# Patient Record
Sex: FEMALE | Race: WHITE | NOT HISPANIC OR LATINO | Employment: FULL TIME | URBAN - METROPOLITAN AREA
[De-identification: names, ages, dates, MRNs, and addresses within clinical notes are randomized per-mention and may not be internally consistent; named-entity substitution may affect disease eponyms.]

---

## 2017-11-30 ENCOUNTER — OFFICE VISIT (OUTPATIENT)
Dept: OBSTETRICS AND GYNECOLOGY | Age: 47
End: 2017-11-30

## 2017-11-30 VITALS
SYSTOLIC BLOOD PRESSURE: 142 MMHG | WEIGHT: 137 LBS | BODY MASS INDEX: 25.21 KG/M2 | DIASTOLIC BLOOD PRESSURE: 78 MMHG | HEIGHT: 62 IN

## 2017-11-30 DIAGNOSIS — N95.1 MENOPAUSAL SYMPTOMS: ICD-10-CM

## 2017-11-30 DIAGNOSIS — Z12.4 ROUTINE CERVICAL SMEAR: Primary | ICD-10-CM

## 2017-11-30 DIAGNOSIS — Z78.0 MENOPAUSE: ICD-10-CM

## 2017-11-30 DIAGNOSIS — Z00.00 ANNUAL PHYSICAL EXAM: ICD-10-CM

## 2017-11-30 DIAGNOSIS — Z11.51 SPECIAL SCREENING EXAMINATION FOR HUMAN PAPILLOMAVIRUS (HPV): ICD-10-CM

## 2017-11-30 PROCEDURE — 99396 PREV VISIT EST AGE 40-64: CPT | Performed by: OBSTETRICS & GYNECOLOGY

## 2017-11-30 NOTE — PROGRESS NOTES
Subjective   Anna Jordan is a 47 y.o. female is being seen today for an annual exam.  Chief Complaint   Patient presents with   • Gynecologic Exam   .    History of Present Illness  Patient is here for an annual check.  She is under libido distress now because her oldest son is a senior history on a figure at the [employer] thing and whether or not to go the nasal cavity or stay local.  Her daughter her diabetic is a sophomore and doing okay.  There is no new family history.  She still has the IUD doing well with that and has been 2 years since she has been here.  No bleeding pretty much does feel cycling but was nowhere she still does first menopause because of the stress and was going on in her overreact deafness.  No other complaints  The following portions of the patient's history were reviewed and updated as appropriate: allergies, current medications, past family history, past medical history, past social history, past surgical history and problem list.    Vitals:    17 1312   BP: 142/78       PAST MEDICAL HISTORY  History reviewed. No pertinent past medical history.  OB History      Para Term  AB Living    2 2 2       SAB TAB Ectopic Multiple Live Births                Past Surgical History:   Procedure Laterality Date   • BREAST BIOPSY       Family History   Problem Relation Age of Onset   • No Known Problems Mother    • No Known Problems Father    • No Known Problems Sister    • No Known Problems Brother    • No Known Problems Daughter    • No Known Problems Son    • No Known Problems Maternal Grandmother    • No Known Problems Paternal Grandmother    • No Known Problems Maternal Aunt    • No Known Problems Paternal Aunt    • BRCA 1/2 Neg Hx    • Breast cancer Neg Hx    • Colon cancer Neg Hx    • Endometrial cancer Neg Hx    • Ovarian cancer Neg Hx      History   Smoking Status   • Never Smoker   Smokeless Tobacco   • Never Used       Current Outpatient Prescriptions:   •   levonorgestrel (MIRENA, 52 MG,) 20 MCG/24HR IUD, 1 each by Intrauterine route 1 (One) Time., Disp: , Rfl:     There is no immunization history on file for this patient.    Review of Systems   Constitutional: Negative for chills, fatigue, fever and unexpected weight change.   Respiratory: Negative for shortness of breath and wheezing.    Cardiovascular: Negative for chest pain.   Gastrointestinal: Negative for abdominal distention, abdominal pain, blood in stool, constipation, diarrhea and nausea.   Genitourinary: Negative for difficulty urinating, dyspareunia, dysuria, frequency, hematuria, menstrual problem, pelvic pain, urgency and vaginal discharge.   Skin: Negative for rash.   Psychiatric/Behavioral: The patient is nervous/anxious.        Objective   Physical Exam   Constitutional: She is oriented to person, place, and time. Vital signs are normal. She appears well-developed and well-nourished.   Neck: No thyromegaly present.   Cardiovascular: Normal rate, regular rhythm and normal heart sounds.    Pulmonary/Chest: Effort normal. Right breast exhibits no inverted nipple, no mass, no nipple discharge, no skin change and no tenderness. Left breast exhibits no inverted nipple, no mass, no nipple discharge, no skin change and no tenderness. Breasts are symmetrical. There is no breast swelling.   Abdominal: Soft.   Genitourinary: Vagina normal and uterus normal. No breast tenderness, discharge or bleeding. Pelvic exam was performed with patient supine. No labial fusion. There is no rash, tenderness, lesion or injury on the right labia. There is no rash, tenderness, lesion or injury on the left labia. Cervix exhibits no motion tenderness, no discharge and no friability. Right adnexum displays no mass, no tenderness and no fullness. Left adnexum displays no mass, no tenderness and no fullness.   Neurological: She is alert and oriented to person, place, and time.   Psychiatric: She has a normal mood and affect.   Vitals  reviewed.        Assessment/Plan   Anna was seen today for gynecologic exam.    Diagnoses and all orders for this visit:    Routine cervical smear  -     IGP, Apt HPV,rfx 16 / 18,45 - ThinPrep Vial, Cervix    Special screening examination for human papillomavirus (HPV)  -     IGP, Apt HPV,rfx 16 / 18,45 - ThinPrep Vial, Cervix    Menopause  -     Follicle Stimulating Hormone    Annual physical exam    Menopausal symptoms    Normal exam today.  Pap smear was done today.  There is a history of HPV.  Mammogram was done today.  Colonoscopy 50 bone density at menopause diet and exercise were discussed come back in 1 year

## 2017-12-01 LAB — FSH SERPL-ACNC: 9.4 MIU/ML

## 2017-12-04 ENCOUNTER — TELEPHONE (OUTPATIENT)
Dept: OBSTETRICS AND GYNECOLOGY | Age: 47
End: 2017-12-04

## 2017-12-04 NOTE — TELEPHONE ENCOUNTER
----- Message from Gunner Nguyen MD sent at 12/1/2017 12:30 PM EST -----  Tell patient her level for hormone menopause is totally in the normal range

## 2017-12-05 LAB
CYTOLOGIST CVX/VAG CYTO: NORMAL
CYTOLOGY CVX/VAG DOC THIN PREP: NORMAL
DX ICD CODE: NORMAL
HIV 1 & 2 AB SER-IMP: NORMAL
HPV I/H RISK 4 DNA CVX QL PROBE+SIG AMP: NEGATIVE
Lab: NORMAL
OTHER STN SPEC: NORMAL
PATH REPORT.FINAL DX SPEC: NORMAL
STAT OF ADQ CVX/VAG CYTO-IMP: NORMAL

## 2018-12-11 ENCOUNTER — PROCEDURE VISIT (OUTPATIENT)
Dept: OBSTETRICS AND GYNECOLOGY | Age: 48
End: 2018-12-11

## 2018-12-11 ENCOUNTER — OFFICE VISIT (OUTPATIENT)
Dept: OBSTETRICS AND GYNECOLOGY | Age: 48
End: 2018-12-11

## 2018-12-11 ENCOUNTER — APPOINTMENT (OUTPATIENT)
Dept: WOMENS IMAGING | Facility: HOSPITAL | Age: 48
End: 2018-12-11

## 2018-12-11 VITALS
DIASTOLIC BLOOD PRESSURE: 84 MMHG | HEIGHT: 62 IN | SYSTOLIC BLOOD PRESSURE: 124 MMHG | BODY MASS INDEX: 24.84 KG/M2 | WEIGHT: 135 LBS

## 2018-12-11 DIAGNOSIS — Z12.31 VISIT FOR SCREENING MAMMOGRAM: Primary | ICD-10-CM

## 2018-12-11 DIAGNOSIS — Z00.00 ANNUAL PHYSICAL EXAM: Primary | ICD-10-CM

## 2018-12-11 PROBLEM — M54.2 NECK PAIN: Status: ACTIVE | Noted: 2018-12-11

## 2018-12-11 PROCEDURE — 77067 SCR MAMMO BI INCL CAD: CPT | Performed by: OBSTETRICS & GYNECOLOGY

## 2018-12-11 PROCEDURE — 99396 PREV VISIT EST AGE 40-64: CPT | Performed by: OBSTETRICS & GYNECOLOGY

## 2018-12-11 PROCEDURE — 77067 SCR MAMMO BI INCL CAD: CPT | Performed by: RADIOLOGY

## 2018-12-11 NOTE — PROGRESS NOTES
Subjective   Anna Jordan is a 48 y.o. female is being seen today for   Chief Complaint   Patient presents with   • Gynecologic Exam     AE today. MG today @ 10:00.   2017 neg pap, neg HPV (HX of ANISHA-1, LEEP).  MG today.    .    History of Present Illness  Patient is here for an annual check.  She is doing well no complaints this year no medical problems.  Nothing new in the family and a bowels bladder work well.  Her son is at  in the [employer] the [place] thing did not work out and he is potentially dating someone might be little different.  Daughter is now jennifer high school diabetes under control started her cycles tried 1 birth control did not agree with that.  No other complaints  The following portions of the patient's history were reviewed and updated as appropriate: allergies, current medications, past family history, past medical history, past social history, past surgical history and problem list.    Vitals:    18 0929   BP: 124/84       PAST MEDICAL HISTORY  Past Medical History:   Diagnosis Date   • Dysplasia of cervix, low grade (ANISHA 1)      OB History      Para Term  AB Living    2 2 2     2    SAB TAB Ectopic Molar Multiple Live Births              2        Past Surgical History:   Procedure Laterality Date   • BREAST BIOPSY     • CERVICAL BIOPSY  W/ LOOP ELECTRODE EXCISION       Family History   Problem Relation Age of Onset   • No Known Problems Mother    • No Known Problems Father    • No Known Problems Sister    • No Known Problems Brother    • No Known Problems Daughter    • No Known Problems Son    • No Known Problems Maternal Grandmother    • No Known Problems Paternal Grandmother    • No Known Problems Maternal Aunt    • No Known Problems Paternal Aunt    • BRCA 1/2 Neg Hx    • Breast cancer Neg Hx    • Colon cancer Neg Hx    • Endometrial cancer Neg Hx    • Ovarian cancer Neg Hx      Social History     Tobacco Use   Smoking Status Never Smoker   Smokeless Tobacco Never  Used       Current Outpatient Medications:   •  levonorgestrel (MIRENA, 52 MG,) 20 MCG/24HR IUD, 1 each by Intrauterine route 1 (One) Time., Disp: , Rfl:     There is no immunization history on file for this patient.    Review of Systems   Constitutional: Negative for chills, fatigue, fever and unexpected weight change.   Respiratory: Negative for shortness of breath and wheezing.    Cardiovascular: Negative for chest pain.   Gastrointestinal: Negative for abdominal distention, abdominal pain, blood in stool, constipation, diarrhea and nausea.   Genitourinary: Negative for difficulty urinating, dyspareunia, dysuria, frequency, hematuria, menstrual problem, pelvic pain, urgency and vaginal discharge.   Skin: Negative for rash.       Objective   Physical Exam   Constitutional: She is oriented to person, place, and time. Vital signs are normal. She appears well-developed and well-nourished.   Neck: No thyromegaly present.   Cardiovascular: Normal rate, regular rhythm and normal heart sounds.   Pulmonary/Chest: Effort normal. Right breast exhibits no inverted nipple, no mass, no nipple discharge, no skin change and no tenderness. Left breast exhibits no inverted nipple, no mass, no nipple discharge, no skin change and no tenderness. Breasts are symmetrical. There is no breast swelling.   Abdominal: Soft.   Genitourinary: Vagina normal and uterus normal. No breast tenderness, discharge or bleeding. Pelvic exam was performed with patient supine. No labial fusion. There is no rash, tenderness, lesion or injury on the right labia. There is no rash, tenderness, lesion or injury on the left labia. Cervix exhibits no motion tenderness, no discharge and no friability. Right adnexum displays no mass, no tenderness and no fullness. Left adnexum displays no mass, no tenderness and no fullness.   Neurological: She is alert and oriented to person, place, and time.   Psychiatric: She has a normal mood and affect.   Vitals  reviewed.        Assessment/Plan   Anna was seen today for gynecologic exam.    Diagnoses and all orders for this visit:    Annual physical exam      Normal exam today.  Pap smear is done last year so not done today.  Mammogram was done today.  Colonoscopy next year.  Still doing well with the IUD has 2 more years to go.  She is good with diet and exercise come back in a year

## 2020-04-29 ENCOUNTER — PROCEDURE VISIT (OUTPATIENT)
Dept: OBSTETRICS AND GYNECOLOGY | Age: 50
End: 2020-04-29

## 2020-04-29 ENCOUNTER — APPOINTMENT (OUTPATIENT)
Dept: WOMENS IMAGING | Facility: HOSPITAL | Age: 50
End: 2020-04-29

## 2020-04-29 DIAGNOSIS — Z12.31 VISIT FOR SCREENING MAMMOGRAM: Primary | ICD-10-CM

## 2020-04-29 PROCEDURE — 77067 SCR MAMMO BI INCL CAD: CPT | Performed by: OBSTETRICS & GYNECOLOGY

## 2020-04-29 PROCEDURE — 77067 SCR MAMMO BI INCL CAD: CPT | Performed by: RADIOLOGY

## 2020-04-30 DIAGNOSIS — R92.8 ABNORMAL MAMMOGRAM: Primary | ICD-10-CM

## 2020-04-30 PROCEDURE — 76641 ULTRASOUND BREAST COMPLETE: CPT | Performed by: OBSTETRICS & GYNECOLOGY

## 2020-05-08 ENCOUNTER — APPOINTMENT (OUTPATIENT)
Dept: WOMENS IMAGING | Facility: HOSPITAL | Age: 50
End: 2020-05-08

## 2020-05-08 ENCOUNTER — TELEPHONE (OUTPATIENT)
Dept: OBSTETRICS AND GYNECOLOGY | Age: 50
End: 2020-05-08

## 2020-05-08 PROCEDURE — 77065 DX MAMMO INCL CAD UNI: CPT | Performed by: RADIOLOGY

## 2020-05-08 PROCEDURE — 76641 ULTRASOUND BREAST COMPLETE: CPT | Performed by: RADIOLOGY

## 2020-05-08 PROCEDURE — 77061 BREAST TOMOSYNTHESIS UNI: CPT | Performed by: RADIOLOGY

## 2020-05-08 PROCEDURE — G0279 TOMOSYNTHESIS, MAMMO: HCPCS | Performed by: RADIOLOGY

## 2020-05-08 NOTE — TELEPHONE ENCOUNTER
(Wendy pt) Pt is wanting to speak with Dr. Nguyen in regards to additional imaging she is needing. Pt is aware you are not in office until Tuesday and is okay to wait.     [phone]

## 2020-05-11 ENCOUNTER — TELEPHONE (OUTPATIENT)
Dept: OBSTETRICS AND GYNECOLOGY | Age: 50
End: 2020-05-11

## 2020-05-11 NOTE — TELEPHONE ENCOUNTER
(Wendy pt) Lanie from [facility] is calling for a left breast cyst aspiration order. Pt is not aware and they are faxing over the report.     [phone]

## 2020-05-12 ENCOUNTER — PROCEDURE VISIT (OUTPATIENT)
Dept: OBSTETRICS AND GYNECOLOGY | Age: 50
End: 2020-05-12

## 2020-05-12 ENCOUNTER — OFFICE VISIT (OUTPATIENT)
Dept: OBSTETRICS AND GYNECOLOGY | Age: 50
End: 2020-05-12

## 2020-05-12 VITALS
HEIGHT: 62 IN | SYSTOLIC BLOOD PRESSURE: 120 MMHG | DIASTOLIC BLOOD PRESSURE: 74 MMHG | BODY MASS INDEX: 26.5 KG/M2 | WEIGHT: 144 LBS

## 2020-05-12 DIAGNOSIS — N60.02 BREAST CYST, LEFT: ICD-10-CM

## 2020-05-12 DIAGNOSIS — R14.0 BLOATING: Primary | ICD-10-CM

## 2020-05-12 DIAGNOSIS — R14.0 ABDOMINAL BLOATING: Primary | ICD-10-CM

## 2020-05-12 DIAGNOSIS — R92.8 ABNORMAL MAMMOGRAM: ICD-10-CM

## 2020-05-12 DIAGNOSIS — N83.201 CYSTS OF BOTH OVARIES: ICD-10-CM

## 2020-05-12 DIAGNOSIS — R92.8 ABNORMAL MAMMOGRAM: Primary | ICD-10-CM

## 2020-05-12 DIAGNOSIS — N83.202 CYSTS OF BOTH OVARIES: ICD-10-CM

## 2020-05-12 PROCEDURE — 76830 TRANSVAGINAL US NON-OB: CPT | Performed by: OBSTETRICS & GYNECOLOGY

## 2020-05-12 PROCEDURE — 99213 OFFICE O/P EST LOW 20 MIN: CPT | Performed by: OBSTETRICS & GYNECOLOGY

## 2020-05-12 NOTE — PROGRESS NOTES
Subjective   Anna Jordan is a 49 y.o. female is being seen today for   Chief Complaint   Patient presents with   • Follow-up     US today. Pt c/o bloating.    .    History of Present Illness  Patient is here for a problem check.  He been complaining of abdominal bloating some back pain fairly recently.  We have discussed this on the phone and I recommend an ultrasound to to rule out any ovarian pathology.  So she presents with that.  She denies eating still having cycles.  She also had abnormal mammogram but call back and had a recommended a potential aspiration versus biopsy and I will order that we talked about that today.  The following portions of the patient's history were reviewed and updated as appropriate: allergies, current medications, past family history, past medical history, past social history, past surgical history and problem list.    Vitals:    20 1315   BP: 120/74         PAST MEDICAL HISTORY  Past Medical History:   Diagnosis Date   • Dysplasia of cervix, low grade (ANISHA 1)      OB History        2    Para   2    Term   2            AB        Living   2       SAB        TAB        Ectopic        Molar        Multiple        Live Births   2              Past Surgical History:   Procedure Laterality Date   • BREAST BIOPSY     • CERVICAL BIOPSY  W/ LOOP ELECTRODE EXCISION       Family History   Problem Relation Age of Onset   • No Known Problems Mother    • No Known Problems Father    • No Known Problems Sister    • No Known Problems Brother    • No Known Problems Daughter    • No Known Problems Son    • No Known Problems Maternal Grandmother    • No Known Problems Paternal Grandmother    • No Known Problems Maternal Aunt    • No Known Problems Paternal Aunt    • BRCA 1/2 Neg Hx    • Breast cancer Neg Hx    • Colon cancer Neg Hx    • Endometrial cancer Neg Hx    • Ovarian cancer Neg Hx      Social History     Tobacco Use   Smoking Status Never Smoker   Smokeless Tobacco Never  Used       Current Outpatient Medications:   •  levonorgestrel (MIRENA, 52 MG,) 20 MCG/24HR IUD, 1 each by Intrauterine route 1 (One) Time., Disp: , Rfl:     There is no immunization history on file for this patient.    Review of Systems  Abdominal bloating  Objective   Physical Exam  Well-developed well-nourished female no acute distress    Assessment/Plan   Anna was seen today for follow-up.    Diagnoses and all orders for this visit:    Abdominal bloating    Abnormal mammogram      Transvaginal ultrasound was performed and I was in the room the whole time discussed everything with the patient.  Basically the ultrasound was normal uterus looked fine lining was appropriate IUD present she had a couple cysts on the left ovary is maximum is 18 mm look very clean and the right ovary is 26 mm again look clean also.  To have discussion like to have her back repeat in a couple of months.  The plan is follow-up couple months with the ultrasound and repeat the breast aspiration/biopsy

## 2020-05-20 ENCOUNTER — APPOINTMENT (OUTPATIENT)
Dept: WOMENS IMAGING | Facility: HOSPITAL | Age: 50
End: 2020-05-20

## 2020-05-20 PROCEDURE — 76942 ECHO GUIDE FOR BIOPSY: CPT | Performed by: RADIOLOGY

## 2020-05-20 PROCEDURE — 19000 PUNCTURE ASPIR CYST BREAST: CPT | Performed by: RADIOLOGY

## 2020-10-19 ENCOUNTER — OFFICE VISIT (OUTPATIENT)
Dept: OBSTETRICS AND GYNECOLOGY | Age: 50
End: 2020-10-19

## 2020-10-19 VITALS
WEIGHT: 142.4 LBS | SYSTOLIC BLOOD PRESSURE: 100 MMHG | HEIGHT: 62 IN | DIASTOLIC BLOOD PRESSURE: 62 MMHG | BODY MASS INDEX: 26.2 KG/M2

## 2020-10-19 DIAGNOSIS — Z11.51 ENCOUNTER FOR SCREENING FOR HUMAN PAPILLOMAVIRUS (HPV): ICD-10-CM

## 2020-10-19 DIAGNOSIS — Z01.419 ENCOUNTER FOR GYNECOLOGICAL EXAMINATION: Primary | ICD-10-CM

## 2020-10-19 DIAGNOSIS — R92.8 ABNORMAL MAMMOGRAM: ICD-10-CM

## 2020-10-19 DIAGNOSIS — Z12.11 SCREENING FOR COLON CANCER: ICD-10-CM

## 2020-10-19 PROCEDURE — 99396 PREV VISIT EST AGE 40-64: CPT | Performed by: OBSTETRICS & GYNECOLOGY

## 2020-10-19 NOTE — PROGRESS NOTES
.  Subjective     Chief Complaint   Patient presents with   • Gynecologic Exam     NP  AE  LAST PAP 17-, HPV- (MERVAT), MG 20       History of Present Illness    Anna Jordan is a 50 y.o.  who presents for annual exam. She is a pt of Dr. Nguyen and new to this MD.  She has spotting about every-other-month with Mirena  She denies any gyn issues today  She lives outside [place]. 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  Her daughter is starting at [employer] and will be running for [employer]. Her son is at [employer] as well.    She had f/u breast imaging and biopsy in spring. She denies any masses or breast concerns today    Obstetric History:  OB History        2    Para   2    Term   2            AB        Living   2       SAB        TAB        Ectopic        Molar        Multiple        Live Births   2               Menstrual History:     No LMP recorded (lmp unknown). (Menstrual status: Other).         Current contraception: IUD--pt believes in was placed , reports she has mirena  History of abnormal Pap smear: yes, treated with LEEP  Received Gardasil immunization: no  Perform regular self breast exam: no  Family history of uterine or ovarian cancer: no  Family History of colon cancer: pt is unsure  Family history of breast cancer: yes - mat cousin    Mammogram: had bilateral mammogram 2020, had cyst aspiration that was benign. Due for f/u mammogram on left  Colonoscopy: recommended.  DEXA: not indicated.    Exercise: moderately active  Calcium/Vitamin D: adequate intake    The following portions of the patient's history were reviewed and updated as appropriate: allergies, current medications, past family history, past medical history, past social history, past surgical history and problem list.    Review of Systems    Review of Systems   Constitutional: Negative for fatigue.   Respiratory: Negative for shortness of breath.    Gastrointestinal: Negative for abdominal  "pain. Negative for change in bowel habits  Genitourinary: Negative for dysuria. Negative for abnormal bleeding or pelvic pain  Neurological: Negative for headaches.   Psychiatric/Behavioral: Negative for dysphoric mood.         Objective   Physical Exam    /62   Ht 157.5 cm (62\")   Wt 64.6 kg (142 lb 6.4 oz)   LMP  (LMP Unknown) Comment: Mirena  Breastfeeding No   BMI 26.05 kg/m²   General:   alert and no distress   Heart: regular rate and rhythm   Lungs: clear to auscultation bilaterally   Breast: Inspection negative; no masses, retractions, nipple discharge or axillary adenopathy   Neck: Supple, no thyromegaly   Abdomen: soft, non-tender. No masses,  no organomegaly   Pelvis: External genitalia: normal general appearance  Urinary system: urethral meatus normal  Vaginal: normal mucosa without prolapse or lesions  Cervix: normal appearance and IUD string visualized  Adnexa: no masses or tenderness  Uterus: normal, nontender   Extremities: Extremities normal, atraumatic, no cyanosis or edema   Neurologic: Alert and oriented   Psychiatric: Normal affect, judgement, and mood     Assessment/Plan   Diagnoses and all orders for this visit:    1. Encounter for gynecological examination (Primary)  -     IGP, Apt HPV,rfx 16 / 18,45    2. Encounter for screening for human papillomavirus (HPV)  -     IGP, Apt HPV,rfx 16 / 18,45    3. Screening for colon cancer  -     Ambulatory Referral to General Surgery    4. Abnormal mammogram  -     Mammo diagnostic digital tomosynthesis left w CAD; Future        All questions answered.  Breast self exam technique reviewed and patient encouraged to perform self-exam monthly.  Discussed healthy lifestyle modifications.  Recommended 30 minutes of aerobic exercise five times per week.  Discussed calcium needs to prevent osteoporosis.    Advised pt to call if she does not receive results of pap within 2 weeks  Encouraged her to book f/u breast imaging as she is due in November. Pt " reports she will schedule. Orders placed.   Advised pt to call if she does not receive contact to schedule colonoscopy within 2 weeks    Pt denies menopause symptoms and desires to keep mirena for now

## 2020-10-21 LAB
CYTOLOGIST CVX/VAG CYTO: NORMAL
CYTOLOGY CVX/VAG DOC CYTO: NORMAL
CYTOLOGY CVX/VAG DOC THIN PREP: NORMAL
DX ICD CODE: NORMAL
HIV 1 & 2 AB SER-IMP: NORMAL
HPV I/H RISK 4 DNA CVX QL PROBE+SIG AMP: NEGATIVE
OTHER STN SPEC: NORMAL
STAT OF ADQ CVX/VAG CYTO-IMP: NORMAL

## 2020-10-22 ENCOUNTER — TELEPHONE (OUTPATIENT)
Dept: OBSTETRICS AND GYNECOLOGY | Age: 50
End: 2020-10-22

## 2020-10-22 NOTE — TELEPHONE ENCOUNTER
----- Message from Nova Cowan MD sent at 10/21/2020 10:37 PM EDT -----  Please call Anna, her pap and hpv are negative/normal

## 2021-10-12 ENCOUNTER — TELEPHONE (OUTPATIENT)
Dept: INTERNAL MEDICINE | Facility: CLINIC | Age: 51
End: 2021-10-12

## 2021-10-12 DIAGNOSIS — Z00.00 ROUTINE PHYSICAL EXAMINATION: Primary | ICD-10-CM

## 2021-10-12 NOTE — TELEPHONE ENCOUNTER
Make her a visit in the next week or so probably next week with labs    CBC, comprehensive profile, lipid profile, TSH, 25-hydroxy vitamin D level    Make those out and I will sign those she can do those prior to her visit with me put her on at the end of the day or first person of the day

## 2021-10-12 NOTE — TELEPHONE ENCOUNTER
Pt hasn't been seen for a while and she is wanting labs and she states that her kids are stressing her out and she stated that you would know what that means.     She would be a new patient to you.

## 2021-10-15 ENCOUNTER — LAB (OUTPATIENT)
Dept: LAB | Facility: HOSPITAL | Age: 51
End: 2021-10-15

## 2021-10-15 DIAGNOSIS — Z00.00 ROUTINE PHYSICAL EXAMINATION: ICD-10-CM

## 2021-10-15 LAB
25(OH)D3 SERPL-MCNC: 47.5 NG/ML
ALBUMIN SERPL-MCNC: 4.5 G/DL (ref 3.5–5.2)
ALBUMIN/GLOB SERPL: 1.7 G/DL
ALP SERPL-CCNC: 56 U/L (ref 39–117)
ALT SERPL W P-5'-P-CCNC: 14 U/L (ref 1–33)
ANION GAP SERPL CALCULATED.3IONS-SCNC: 10.7 MMOL/L (ref 5–15)
AST SERPL-CCNC: 20 U/L (ref 1–32)
BASOPHILS # BLD AUTO: 0.07 10*3/MM3 (ref 0–0.2)
BASOPHILS NFR BLD AUTO: 1.4 % (ref 0–1.5)
BILIRUB SERPL-MCNC: 0.4 MG/DL (ref 0–1.2)
BUN SERPL-MCNC: 10 MG/DL (ref 6–20)
BUN/CREAT SERPL: 10.9 (ref 7–25)
CALCIUM SPEC-SCNC: 9.4 MG/DL (ref 8.6–10.5)
CHLORIDE SERPL-SCNC: 104 MMOL/L (ref 98–107)
CHOLEST SERPL-MCNC: 188 MG/DL (ref 0–200)
CO2 SERPL-SCNC: 27.3 MMOL/L (ref 22–29)
CREAT SERPL-MCNC: 0.92 MG/DL (ref 0.57–1)
DEPRECATED RDW RBC AUTO: 39.3 FL (ref 37–54)
EOSINOPHIL # BLD AUTO: 0.05 10*3/MM3 (ref 0–0.4)
EOSINOPHIL NFR BLD AUTO: 1 % (ref 0.3–6.2)
ERYTHROCYTE [DISTWIDTH] IN BLOOD BY AUTOMATED COUNT: 12 % (ref 12.3–15.4)
GFR SERPL CREATININE-BSD FRML MDRD: 64 ML/MIN/1.73
GLOBULIN UR ELPH-MCNC: 2.6 GM/DL
GLUCOSE SERPL-MCNC: 101 MG/DL (ref 65–99)
HCT VFR BLD AUTO: 42.5 % (ref 34–46.6)
HDLC SERPL-MCNC: 68 MG/DL (ref 40–60)
HGB BLD-MCNC: 14.4 G/DL (ref 12–15.9)
IMM GRANULOCYTES # BLD AUTO: 0.03 10*3/MM3 (ref 0–0.05)
IMM GRANULOCYTES NFR BLD AUTO: 0.6 % (ref 0–0.5)
LDLC SERPL CALC-MCNC: 111 MG/DL (ref 0–100)
LDLC/HDLC SERPL: 1.62 {RATIO}
LYMPHOCYTES # BLD AUTO: 1.39 10*3/MM3 (ref 0.7–3.1)
LYMPHOCYTES NFR BLD AUTO: 28.5 % (ref 19.6–45.3)
MCH RBC QN AUTO: 30.5 PG (ref 26.6–33)
MCHC RBC AUTO-ENTMCNC: 33.9 G/DL (ref 31.5–35.7)
MCV RBC AUTO: 90 FL (ref 79–97)
MONOCYTES # BLD AUTO: 0.32 10*3/MM3 (ref 0.1–0.9)
MONOCYTES NFR BLD AUTO: 6.6 % (ref 5–12)
NEUTROPHILS NFR BLD AUTO: 3.02 10*3/MM3 (ref 1.7–7)
NEUTROPHILS NFR BLD AUTO: 61.9 % (ref 42.7–76)
NRBC BLD AUTO-RTO: 0 /100 WBC (ref 0–0.2)
PLATELET # BLD AUTO: 246 10*3/MM3 (ref 140–450)
PMV BLD AUTO: 10.7 FL (ref 6–12)
POTASSIUM SERPL-SCNC: 4.4 MMOL/L (ref 3.5–5.2)
PROT SERPL-MCNC: 7.1 G/DL (ref 6–8.5)
RBC # BLD AUTO: 4.72 10*6/MM3 (ref 3.77–5.28)
SODIUM SERPL-SCNC: 142 MMOL/L (ref 136–145)
TRIGL SERPL-MCNC: 49 MG/DL (ref 0–150)
TSH SERPL DL<=0.05 MIU/L-ACNC: 0.74 UIU/ML (ref 0.27–4.2)
VLDLC SERPL-MCNC: 9 MG/DL (ref 5–40)
WBC # BLD AUTO: 4.88 10*3/MM3 (ref 3.4–10.8)

## 2021-10-15 PROCEDURE — 36415 COLL VENOUS BLD VENIPUNCTURE: CPT

## 2021-10-15 PROCEDURE — 80061 LIPID PANEL: CPT

## 2021-10-15 PROCEDURE — 80053 COMPREHEN METABOLIC PANEL: CPT

## 2021-10-15 PROCEDURE — 84443 ASSAY THYROID STIM HORMONE: CPT

## 2021-10-15 PROCEDURE — 85025 COMPLETE CBC W/AUTO DIFF WBC: CPT

## 2021-10-15 PROCEDURE — 82306 VITAMIN D 25 HYDROXY: CPT

## 2021-10-26 ENCOUNTER — PROCEDURE VISIT (OUTPATIENT)
Dept: OBSTETRICS AND GYNECOLOGY | Age: 51
End: 2021-10-26

## 2021-10-26 ENCOUNTER — APPOINTMENT (OUTPATIENT)
Dept: WOMENS IMAGING | Facility: HOSPITAL | Age: 51
End: 2021-10-26

## 2021-10-26 ENCOUNTER — OFFICE VISIT (OUTPATIENT)
Dept: OBSTETRICS AND GYNECOLOGY | Age: 51
End: 2021-10-26

## 2021-10-26 VITALS
BODY MASS INDEX: 23.19 KG/M2 | SYSTOLIC BLOOD PRESSURE: 110 MMHG | WEIGHT: 126 LBS | HEIGHT: 62 IN | DIASTOLIC BLOOD PRESSURE: 68 MMHG

## 2021-10-26 DIAGNOSIS — Z12.31 VISIT FOR SCREENING MAMMOGRAM: Primary | ICD-10-CM

## 2021-10-26 DIAGNOSIS — Z12.11 SCREENING FOR COLON CANCER: ICD-10-CM

## 2021-10-26 DIAGNOSIS — R10.2 ADNEXAL TENDERNESS: ICD-10-CM

## 2021-10-26 DIAGNOSIS — Z11.51 ENCOUNTER FOR SCREENING FOR HUMAN PAPILLOMAVIRUS (HPV): Primary | ICD-10-CM

## 2021-10-26 DIAGNOSIS — Z01.419 ENCOUNTER FOR GYNECOLOGICAL EXAMINATION: ICD-10-CM

## 2021-10-26 PROCEDURE — 77063 BREAST TOMOSYNTHESIS BI: CPT | Performed by: STUDENT IN AN ORGANIZED HEALTH CARE EDUCATION/TRAINING PROGRAM

## 2021-10-26 PROCEDURE — 77063 BREAST TOMOSYNTHESIS BI: CPT | Performed by: RADIOLOGY

## 2021-10-26 PROCEDURE — 77067 SCR MAMMO BI INCL CAD: CPT | Performed by: STUDENT IN AN ORGANIZED HEALTH CARE EDUCATION/TRAINING PROGRAM

## 2021-10-26 PROCEDURE — 77067 SCR MAMMO BI INCL CAD: CPT | Performed by: RADIOLOGY

## 2021-10-26 PROCEDURE — 99396 PREV VISIT EST AGE 40-64: CPT | Performed by: OBSTETRICS & GYNECOLOGY

## 2021-10-26 RX ORDER — MULTIPLE VITAMINS W/ MINERALS TAB 9MG-400MCG
1 TAB ORAL DAILY
COMMUNITY

## 2021-10-26 NOTE — PROGRESS NOTES
".  Subjective     Chief Complaint   Patient presents with   • Gynecologic Exam     AC, last pap 10-.       History of Present Illness    Anna Jordan is a 51 y.o.  who presents for annual exam.  She reports she has very light monthly menses/spotting with Mirena  She notes her mother  this last year. She had recently had a stent but they are not sure of the cause. She notes it has been rough but she is dealing.       Obstetric History:  OB History        2    Para   2    Term   2            AB        Living   2       SAB        IAB        Ectopic        Molar        Multiple        Live Births   2               Menstrual History:     No LMP recorded. Patient has had an implant.         Current contraception: IUD--pt unsure when placed ( reviewed records, Dr. Nguyen had noted in 2018 that she had 2 more years )  History of abnormal Pap smear: yes - had LEEP in past  Received Gardasil immunization: no  Perform regular self breast exam: no  Family history of uterine or ovarian cancer: no  Family History of colon cancer: no  Family history of breast cancer: yes - cousin    Mammogram: ordered.  Colonoscopy: recommended.  DEXA: not indicated.    Exercise: moderately active  Calcium/Vitamin D: adequate intake    The following portions of the patient's history were reviewed and updated as appropriate: allergies, current medications, past family history, past medical history, past social history, past surgical history and problem list.    Review of Systems    Review of Systems   Constitutional: Negative for fatigue.   Respiratory: Negative for shortness of breath.    Gastrointestinal: Positive for occ abdominal pain and change in bowel habits recently.   Genitourinary: Negative for heavy bleeding with Mirena  Neurological: Negative for headaches.   Psychiatric/Behavioral: Negative for dysphoric mood.         Objective   Physical Exam    /68   Ht 157.5 cm (62\")   Wt 57.2 kg (126 lb) "   Breastfeeding No   BMI 23.05 kg/m²   General:   alert and no distress   Heart: regular rate and rhythm   Lungs: clear to auscultation bilaterally   Breast: Inspection negative; no masses, retractions, nipple discharge or axillary adenopathy   Neck: supple   Abdomen: soft, non-tender. No masses,  no organomegaly   Pelvis: External genitalia: normal general appearance  Urinary system: urethral meatus normal  Vaginal: normal mucosa without prolapse or lesions  Cervix: normal appearance and IUD string visualized  Adnexa: no masses, tender to left  Uterus: normal, nontender   Extremities: Extremities normal, atraumatic, no cyanosis or edema   Neurologic: Alert and oriented   Psychiatric: Normal affect, judgement, and mood     Assessment/Plan   Diagnoses and all orders for this visit:    1. Encounter for screening for human papillomavirus (HPV) (Primary)  -     IGP, Apt HPV,rfx 16 / 18,45    2. Encounter for gynecological examination  -     IGP, Apt HPV,rfx 16 / 18,45    3. Screening for colon cancer  -     Ambulatory Referral For Screening Colonoscopy    4. Adnexal tenderness        All questions answered.  Breast self exam technique reviewed and patient encouraged to perform self-exam monthly.  Discussed healthy lifestyle modifications.  Recommended 30 minutes of aerobic exercise five times per week.  Discussed calcium needs to prevent osteoporosis.  Advised pt to call if she does not receive results of pap and mammogram within 2 weeks    Recommend u/s for adnexal tenderness and ordered. Pt agrees.     Pt did not proceed with colonoscopy after ordered last year. She reports she will consider this year. Order placed. Recommend she call office if she does not receive contact to book within 2 weeks.     Recommend removal of IUD. Discussed that records indicated Dr. Nguyen planned removal in 2020. Discussed that Mirena approval for use extended to 7 years. Discussed that she may be menopausal and could remove IUD and  observe or could check FSH. She will consider and declines removal of IUD at this time.

## 2021-11-01 ENCOUNTER — TELEPHONE (OUTPATIENT)
Dept: OBSTETRICS AND GYNECOLOGY | Age: 51
End: 2021-11-01

## 2021-11-01 PROBLEM — N83.202 CYSTS OF BOTH OVARIES: Status: RESOLVED | Noted: 2020-05-12 | Resolved: 2021-11-01

## 2021-11-01 PROBLEM — N83.201 CYSTS OF BOTH OVARIES: Status: RESOLVED | Noted: 2020-05-12 | Resolved: 2021-11-01

## 2021-11-01 PROBLEM — R14.0 ABDOMINAL BLOATING: Status: RESOLVED | Noted: 2020-05-12 | Resolved: 2021-11-01

## 2021-11-01 LAB
CYTOLOGIST CVX/VAG CYTO: NORMAL
CYTOLOGY CVX/VAG DOC CYTO: NORMAL
CYTOLOGY CVX/VAG DOC THIN PREP: NORMAL
DX ICD CODE: NORMAL
HIV 1 & 2 AB SER-IMP: NORMAL
HPV I/H RISK 4 DNA CVX QL PROBE+SIG AMP: NEGATIVE
OTHER STN SPEC: NORMAL
PATHOLOGIST CVX/VAG CYTO: NORMAL
STAT OF ADQ CVX/VAG CYTO-IMP: NORMAL

## 2021-11-01 NOTE — TELEPHONE ENCOUNTER
Called pt and reviewed u/s results. Normal ovaries noted and IUD in place. She wants to leave IUD for now. Discussed normal pap results as well. Advised her to call if she does not receive results of mammogram within the next week. It is pending currently.

## 2022-10-31 ENCOUNTER — PROCEDURE VISIT (OUTPATIENT)
Dept: OBSTETRICS AND GYNECOLOGY | Age: 52
End: 2022-10-31

## 2022-10-31 ENCOUNTER — APPOINTMENT (OUTPATIENT)
Dept: WOMENS IMAGING | Facility: HOSPITAL | Age: 52
End: 2022-10-31

## 2022-10-31 ENCOUNTER — OFFICE VISIT (OUTPATIENT)
Dept: OBSTETRICS AND GYNECOLOGY | Age: 52
End: 2022-10-31

## 2022-10-31 VITALS
WEIGHT: 131 LBS | DIASTOLIC BLOOD PRESSURE: 62 MMHG | SYSTOLIC BLOOD PRESSURE: 110 MMHG | BODY MASS INDEX: 24.11 KG/M2 | HEIGHT: 62 IN

## 2022-10-31 DIAGNOSIS — Z12.11 SCREENING FOR COLON CANCER: ICD-10-CM

## 2022-10-31 DIAGNOSIS — Z01.419 ENCOUNTER FOR GYNECOLOGICAL EXAMINATION: Primary | ICD-10-CM

## 2022-10-31 DIAGNOSIS — Z11.51 ENCOUNTER FOR SCREENING FOR HUMAN PAPILLOMAVIRUS (HPV): ICD-10-CM

## 2022-10-31 DIAGNOSIS — N91.2 AMENORRHEA: ICD-10-CM

## 2022-10-31 DIAGNOSIS — Z12.31 VISIT FOR SCREENING MAMMOGRAM: Primary | ICD-10-CM

## 2022-10-31 PROBLEM — R92.8 ABNORMAL MAMMOGRAM: Status: RESOLVED | Noted: 2020-05-12 | Resolved: 2022-10-31

## 2022-10-31 PROCEDURE — 77067 SCR MAMMO BI INCL CAD: CPT | Performed by: RADIOLOGY

## 2022-10-31 PROCEDURE — 77063 BREAST TOMOSYNTHESIS BI: CPT | Performed by: RADIOLOGY

## 2022-10-31 PROCEDURE — 77067 SCR MAMMO BI INCL CAD: CPT | Performed by: OBSTETRICS & GYNECOLOGY

## 2022-10-31 PROCEDURE — 77063 BREAST TOMOSYNTHESIS BI: CPT | Performed by: OBSTETRICS & GYNECOLOGY

## 2022-10-31 PROCEDURE — 99396 PREV VISIT EST AGE 40-64: CPT | Performed by: OBSTETRICS & GYNECOLOGY

## 2022-10-31 NOTE — PROGRESS NOTES
".  Subjective     Chief Complaint   Patient presents with   • Gynecologic Exam     Annual exam, Last Pap 10/26/201 NEG, HPV NEG,Mammogram today, Pt has no complaints today        History of Present Illness    Anna Jordan is a 52 y.o.  who presents for annual exam.  She is not having menses with Mirena. She denies hot flashes.     She did not schedule colonoscopy last year but is now willing to see a GI in [facility].      Obstetric History:  OB History        2    Para   2    Term   2            AB        Living   2       SAB        IAB        Ectopic        Molar        Multiple        Live Births   2               Menstrual History:     No LMP recorded (lmp unknown). Patient has had an implant.         Current contraception: IUD-mirena, per Dr. Nguyen's notes, it was placed around   History of abnormal Pap smear: yes - LEEP in past  Received Gardasil immunization: no  Perform regular self breast exam: yes  Family history of uterine or ovarian cancer: no  Family History of colon cancer: no  Family history of breast cancer: yes - cousin    Mammogram: done today.  Colonoscopy: recommended.  DEXA: not indicated.    Exercise: moderately active  Calcium/Vitamin D: adequate intake    The following portions of the patient's history were reviewed and updated as appropriate: allergies, current medications, past family history, past medical history, past social history, past surgical history and problem list.    Review of Systems    Review of Systems   Constitutional: Negative for fatigue.   Respiratory: Negative for shortness of breath.    Gastrointestinal: Negative for abdominal pain.   Genitourinary: Negative for vag bleeding  Neurological: Negative for headaches.   Psychiatric/Behavioral: Negative for dysphoric mood.         Objective   Physical Exam    /62   Ht 157.5 cm (62\")   Wt 59.4 kg (131 lb)   LMP  (LMP Unknown)   BMI 23.96 kg/m²   General:   Alert, in no distress   Heart: " regular rate and rhythm   Lungs: clear to auscultation bilaterally   Breast: Inspection negative; no masses, retractions, nipple discharge or axillary adenopathy in either breast   Neck: Supple, no thyromegaly   Abdomen: Soft, no tenderness or guarding   Pelvis: External genitalia: normal general appearance  Urinary system: urethral meatus normal  Vaginal: normal mucosa without prolapse or lesions  Cervix: normal appearance and IUD string visualized  Adnexa: no masses or tenderness  Uterus: normal, nontender   Extremities: Normal without edema   Neurologic: Alert and oriented   Psychiatric: Normal affect, judgment and mood     Assessment & Plan   Diagnoses and all orders for this visit:    1. Encounter for gynecological examination (Primary)  -     IGP, Apt HPV,rfx 16 / 18,45    2. Encounter for screening for human papillomavirus (HPV)  -     IGP, Apt HPV,rfx 16 / 18,45    3. Amenorrhea  -     Follicle Stimulating Hormone    4. Screening for colon cancer  -     Ambulatory Referral For Screening Colonoscopy        All questions answered.  Breast self exam technique reviewed and patient encouraged to perform self-exam monthly.  Discussed healthy lifestyle modifications.  Recommended 30 minutes of aerobic exercise five times per week.  Discussed calcium needs to prevent osteoporosis.  Advised she call if she does not receive results of pap and mammogram within 2 weeks.  She prefers to leave IUD for now with new approval for 8 yrs. Reviewed risks and benefits including increased breast cancer     Pt is willing to book colonoscopy with local [facility]. Referred to requested provider.

## 2022-10-31 NOTE — PROGRESS NOTES
Completed screening mammo 10/31/2022   Pt presents to ED via EMS from home. She lives alone. Pt presents to ED with a possible food bolus; she was eating corned beef today. She did not choke. She reports sensation of upper chest \"fullness\". She denies pain. Last food bolus episode was ~ 2 months ago.

## 2022-11-01 LAB — FSH SERPL-ACNC: 62.5 MIU/ML

## 2023-03-20 ENCOUNTER — OFFICE VISIT (OUTPATIENT)
Dept: FAMILY MEDICINE CLINIC | Facility: CLINIC | Age: 53
End: 2023-03-20
Payer: COMMERCIAL

## 2023-03-20 VITALS
TEMPERATURE: 97.1 F | SYSTOLIC BLOOD PRESSURE: 110 MMHG | DIASTOLIC BLOOD PRESSURE: 61 MMHG | BODY MASS INDEX: 26.39 KG/M2 | HEIGHT: 62 IN | HEART RATE: 77 BPM | RESPIRATION RATE: 18 BRPM | OXYGEN SATURATION: 100 % | WEIGHT: 143.4 LBS

## 2023-03-20 DIAGNOSIS — I34.1 MITRAL VALVE PROLAPSE: ICD-10-CM

## 2023-03-20 DIAGNOSIS — Z12.11 COLON CANCER SCREENING: ICD-10-CM

## 2023-03-20 DIAGNOSIS — E55.9 VITAMIN D DEFICIENCY: ICD-10-CM

## 2023-03-20 DIAGNOSIS — Z11.59 NEED FOR HEPATITIS C SCREENING TEST: ICD-10-CM

## 2023-03-20 DIAGNOSIS — Z13.220 SCREENING FOR LIPID DISORDERS: ICD-10-CM

## 2023-03-20 DIAGNOSIS — Z00.00 ANNUAL PHYSICAL EXAM: Primary | ICD-10-CM

## 2023-03-20 DIAGNOSIS — M79.89 RIGHT LEG SWELLING: ICD-10-CM

## 2023-03-20 PROCEDURE — 99386 PREV VISIT NEW AGE 40-64: CPT | Performed by: FAMILY MEDICINE

## 2023-03-20 PROCEDURE — 99214 OFFICE O/P EST MOD 30 MIN: CPT | Performed by: FAMILY MEDICINE

## 2023-03-20 NOTE — ASSESSMENT & PLAN NOTE
She was encouraged to exercise on a regular basis.  Screening labs were reviewed at today's visit and manage according to findings.  She is current on breast cancer screening.  She was given order today for colonoscopy for colon cancer screening moving forward we will encouraged her to always wear her seatbelt, never text and drive and to get 7 to 8 hours sleep nightly.

## 2023-03-20 NOTE — PROGRESS NOTES
"Chief Complaint  Annual Exam (physical), Back Pain (Below ribs on the left side ), Leg Pain (Pain and swelling behind right leg ), and Mitral valve prolapse     Subjective        Anna Jordan presents to [facility]  History of Present Illness  She is here today to establish relations as new patient.  She has a history of vitamin D deficiency and mitral valve prolaste.  She is a former patient of [facility].    She had her last echo about 15 years ago. She feels a squeezing feeling in her chest when she gets stressed.     She started having right lower leg tightness about 4 weeks.  She denies any significant pain.  She has no history of DVT.    The patient has no other complaints today and denies chest pain, shortness of breath, weakness, numbness, nausea, vomiting, diarrhea, dizziness or syncopal event.            Objective   Vital Signs:  /61   Pulse 77   Temp 97.1 °F (36.2 °C)   Resp 18   Ht 157.5 cm (62.01\")   Wt 65 kg (143 lb 6.4 oz)   SpO2 100%   BMI 26.22 kg/m²   Estimated body mass index is 26.22 kg/m² as calculated from the following:    Height as of this encounter: 157.5 cm (62.01\").    Weight as of this encounter: 65 kg (143 lb 6.4 oz).       BMI is within normal parameters. No other follow-up for BMI required.      Physical Exam  Vitals reviewed.   Constitutional:       Appearance: Normal appearance. She is well-developed.   HENT:      Head: Normocephalic and atraumatic.      Right Ear: External ear normal.      Left Ear: External ear normal.      Mouth/Throat:      Pharynx: No oropharyngeal exudate.   Eyes:      Conjunctiva/sclera: Conjunctivae normal.      Pupils: Pupils are equal, round, and reactive to light.   Neck:      Vascular: No carotid bruit.   Cardiovascular:      Rate and Rhythm: Normal rate and regular rhythm.      Heart sounds: No murmur heard.    No friction rub. No gallop.   Pulmonary:      Effort: Pulmonary effort is normal.      " Breath sounds: Normal breath sounds. No wheezing or rhonchi.   Abdominal:      General: There is no distension.   Musculoskeletal:      Comments: No significant size difference between right and left calf.  No tenderness to palpation of right calf.   Skin:     General: Skin is warm and dry.   Neurological:      Mental Status: She is alert and oriented to person, place, and time.      Cranial Nerves: No cranial nerve deficit.      Motor: No weakness.   Psychiatric:         Mood and Affect: Mood and affect normal.         Behavior: Behavior normal.         Thought Content: Thought content normal.         Judgment: Judgment normal.        Result Review :                                 Assessment and Plan   Diagnoses and all orders for this visit:    1. Annual physical exam (Primary)  Assessment & Plan:  She was encouraged to exercise on a regular basis.  Screening labs were reviewed at today's visit and manage according to findings.  She is current on breast cancer screening.  She was given order today for colonoscopy for colon cancer screening moving forward we will encouraged her to always wear her seatbelt, never text and drive and to get 7 to 8 hours sleep nightly.     Orders:  -     Comprehensive Metabolic Panel; Future  -     CBC & Differential; Future  -     TSH; Future    2. Need for hepatitis C screening test  -     Hepatitis C Antibody; Future    3. Colon cancer screening  -     Ambulatory Referral For Screening Colonoscopy    4. Screening for lipid disorders  -     Lipid Panel; Future    5. Vitamin D deficiency  Assessment & Plan:  Vitamin D levels normal when last checked 15 months ago.  Vitamin D level ordered today to be managed according findings.    Orders:  -     Vitamin D 25 hydroxy; Future    6. Mitral valve prolapse  Assessment & Plan:  She appears to be stable today but does have an occasional bout of chest discomfort which sounds like ectopy.  The patient was given order today for an  echocardiography to be managed according to findings.    Orders:  -     Adult Transthoracic Echo Complete W/ Cont if Necessary Per Protocol; Future    7. Right leg swelling  -     Duplex Venous Lower Extremity - Right CAR; Future           Follow Up   Return in about 1 year (around 3/20/2024).  Patient was given instructions and counseling regarding her condition or for health maintenance advice. Please see specific information pulled into the AVS if appropriate.

## 2023-03-20 NOTE — ASSESSMENT & PLAN NOTE
She appears to be stable today but does have an occasional bout of chest discomfort which sounds like ectopy.  The patient was given order today for an echocardiography to be managed according to findings.

## 2023-03-20 NOTE — ASSESSMENT & PLAN NOTE
Vitamin D levels normal when last checked 15 months ago.  Vitamin D level ordered today to be managed according findings.

## 2023-04-06 ENCOUNTER — LAB (OUTPATIENT)
Dept: LAB | Facility: HOSPITAL | Age: 53
End: 2023-04-06
Payer: COMMERCIAL

## 2023-04-06 DIAGNOSIS — Z11.59 NEED FOR HEPATITIS C SCREENING TEST: ICD-10-CM

## 2023-04-06 DIAGNOSIS — E55.9 VITAMIN D DEFICIENCY: ICD-10-CM

## 2023-04-06 DIAGNOSIS — Z13.220 SCREENING FOR LIPID DISORDERS: ICD-10-CM

## 2023-04-06 DIAGNOSIS — Z00.00 ANNUAL PHYSICAL EXAM: ICD-10-CM

## 2023-04-06 LAB
25(OH)D3 SERPL-MCNC: 26.4 NG/ML (ref 30–100)
ALBUMIN SERPL-MCNC: 4.3 G/DL (ref 3.5–5.2)
ALBUMIN/GLOB SERPL: 2 G/DL
ALP SERPL-CCNC: 52 U/L (ref 39–117)
ALT SERPL W P-5'-P-CCNC: 12 U/L (ref 1–33)
ANION GAP SERPL CALCULATED.3IONS-SCNC: 8.6 MMOL/L (ref 5–15)
AST SERPL-CCNC: 18 U/L (ref 1–32)
BASOPHILS # BLD AUTO: 0.07 10*3/MM3 (ref 0–0.2)
BASOPHILS NFR BLD AUTO: 1.7 % (ref 0–1.5)
BILIRUB SERPL-MCNC: 0.3 MG/DL (ref 0–1.2)
BUN SERPL-MCNC: 16 MG/DL (ref 6–20)
BUN/CREAT SERPL: 19.8 (ref 7–25)
CALCIUM SPEC-SCNC: 9.2 MG/DL (ref 8.6–10.5)
CHLORIDE SERPL-SCNC: 107 MMOL/L (ref 98–107)
CHOLEST SERPL-MCNC: 181 MG/DL (ref 0–200)
CO2 SERPL-SCNC: 24.4 MMOL/L (ref 22–29)
CREAT SERPL-MCNC: 0.81 MG/DL (ref 0.57–1)
DEPRECATED RDW RBC AUTO: 39.6 FL (ref 37–54)
EGFRCR SERPLBLD CKD-EPI 2021: 86.9 ML/MIN/1.73
EOSINOPHIL # BLD AUTO: 0.1 10*3/MM3 (ref 0–0.4)
EOSINOPHIL NFR BLD AUTO: 2.4 % (ref 0.3–6.2)
ERYTHROCYTE [DISTWIDTH] IN BLOOD BY AUTOMATED COUNT: 12.3 % (ref 12.3–15.4)
GLOBULIN UR ELPH-MCNC: 2.1 GM/DL
GLUCOSE SERPL-MCNC: 91 MG/DL (ref 65–99)
HCT VFR BLD AUTO: 39.7 % (ref 34–46.6)
HCV AB SER DONR QL: NORMAL
HDLC SERPL-MCNC: 76 MG/DL (ref 40–60)
HGB BLD-MCNC: 13.6 G/DL (ref 12–15.9)
IMM GRANULOCYTES # BLD AUTO: 0.01 10*3/MM3 (ref 0–0.05)
IMM GRANULOCYTES NFR BLD AUTO: 0.2 % (ref 0–0.5)
LDLC SERPL CALC-MCNC: 97 MG/DL (ref 0–100)
LDLC/HDLC SERPL: 1.28 {RATIO}
LYMPHOCYTES # BLD AUTO: 1.35 10*3/MM3 (ref 0.7–3.1)
LYMPHOCYTES NFR BLD AUTO: 32 % (ref 19.6–45.3)
MCH RBC QN AUTO: 30.1 PG (ref 26.6–33)
MCHC RBC AUTO-ENTMCNC: 34.3 G/DL (ref 31.5–35.7)
MCV RBC AUTO: 87.8 FL (ref 79–97)
MONOCYTES # BLD AUTO: 0.29 10*3/MM3 (ref 0.1–0.9)
MONOCYTES NFR BLD AUTO: 6.9 % (ref 5–12)
NEUTROPHILS NFR BLD AUTO: 2.4 10*3/MM3 (ref 1.7–7)
NEUTROPHILS NFR BLD AUTO: 56.8 % (ref 42.7–76)
NRBC BLD AUTO-RTO: 0 /100 WBC (ref 0–0.2)
PLATELET # BLD AUTO: 229 10*3/MM3 (ref 140–450)
PMV BLD AUTO: 11.2 FL (ref 6–12)
POTASSIUM SERPL-SCNC: 4.5 MMOL/L (ref 3.5–5.2)
PROT SERPL-MCNC: 6.4 G/DL (ref 6–8.5)
RBC # BLD AUTO: 4.52 10*6/MM3 (ref 3.77–5.28)
SODIUM SERPL-SCNC: 140 MMOL/L (ref 136–145)
TRIGL SERPL-MCNC: 40 MG/DL (ref 0–150)
TSH SERPL DL<=0.05 MIU/L-ACNC: 1.8 UIU/ML (ref 0.27–4.2)
VLDLC SERPL-MCNC: 8 MG/DL (ref 5–40)
WBC NRBC COR # BLD: 4.22 10*3/MM3 (ref 3.4–10.8)

## 2023-04-06 PROCEDURE — 80061 LIPID PANEL: CPT

## 2023-04-06 PROCEDURE — 82306 VITAMIN D 25 HYDROXY: CPT

## 2023-04-06 PROCEDURE — 86803 HEPATITIS C AB TEST: CPT

## 2023-04-06 PROCEDURE — 36415 COLL VENOUS BLD VENIPUNCTURE: CPT

## 2023-04-06 PROCEDURE — 80050 GENERAL HEALTH PANEL: CPT

## 2023-08-11 DIAGNOSIS — Z12.31 ENCOUNTER FOR SCREENING MAMMOGRAM FOR MALIGNANT NEOPLASM OF BREAST: Primary | ICD-10-CM

## 2023-11-07 ENCOUNTER — TELEPHONE (OUTPATIENT)
Dept: GASTROENTEROLOGY | Facility: CLINIC | Age: 53
End: 2023-11-07
Payer: COMMERCIAL

## 2023-11-07 ENCOUNTER — PREP FOR SURGERY (OUTPATIENT)
Dept: OTHER | Facility: HOSPITAL | Age: 53
End: 2023-11-07
Payer: COMMERCIAL

## 2023-11-07 DIAGNOSIS — Z12.11 ENCOUNTER FOR SCREENING FOR MALIGNANT NEOPLASM OF COLON: Primary | ICD-10-CM

## 2023-11-07 DIAGNOSIS — Z12.11 COLON CANCER SCREENING: ICD-10-CM

## 2023-11-07 RX ORDER — SODIUM PICOSULFATE, MAGNESIUM OXIDE, AND ANHYDROUS CITRIC ACID 12; 3.5; 1 G/175ML; G/175ML; MG/175ML
175 LIQUID ORAL TAKE AS DIRECTED
Qty: 350 ML | Refills: 0 | Status: SHIPPED | OUTPATIENT
Start: 2023-11-07

## 2023-11-07 NOTE — TELEPHONE ENCOUNTER
Per Dr. Dixon, scheduled patient for screening colonoscopy.     Anna Jordan  1970  53 y.o.    Reason for call: Screening Colonoscopy  Prep prescribed: Clenpiq  Prep instructions reviewed with patient and sent to patient via regular mail to the home address on file  Is the patient currently on any injectable medications for weight loss or diabetes? No  Clearance needed? No  If yes, what clearance is needed? N/A  Clearance has been requested from- N/A  The patient has been scheduled for: Colonoscopy  Family history of colon cancer? No  If yes, indicate relative: n/a  Family History   Problem Relation Age of Onset    No Known Problems Mother     Leukemia Father     No Known Problems Sister     No Known Problems Brother     No Known Problems Maternal Grandmother     Colon cancer Paternal Grandmother     No Known Problems Daughter     No Known Problems Son     No Known Problems Maternal Aunt     No Known Problems Paternal Aunt     Breast cancer Maternal Cousin     BRCA 1/2 Neg Hx     Endometrial cancer Neg Hx     Ovarian cancer Neg Hx      Past Medical History:   Diagnosis Date    Allergic 1997    Iodine    Mitral valve prolapse      Allergies   Allergen Reactions    Iodine Itching     Past Surgical History:   Procedure Laterality Date    BREAST BIOPSY      CERVICAL BIOPSY  W/ LOOP ELECTRODE EXCISION       Social History     Socioeconomic History    Marital status:      Spouse name: GISELA     Number of children: 2   Tobacco Use    Smoking status: Never    Smokeless tobacco: Never    Tobacco comments:     No second hand smoke exposure    Vaping Use    Vaping Use: Never used   Substance and Sexual Activity    Alcohol use: Never    Drug use: Never    Sexual activity: Yes     Partners: Male     Birth control/protection: I.U.D.     Comment: spouse = GISELA       Current Outpatient Medications:     levonorgestrel (MIRENA) 20 MCG/24HR IUD, 1 each by Intrauterine route 1 (One) Time., Disp: , Rfl:     multivitamin  with minerals tablet tablet, Take 1 tablet by mouth Daily., Disp: , Rfl:     vitamin D (ERGOCALCIFEROL) 1.25 MG (32756 UT) capsule capsule, Take 1 capsule by mouth 1 (One) Time Per Week., Disp: 12 capsule, Rfl: 1

## 2023-11-14 ENCOUNTER — OFFICE VISIT (OUTPATIENT)
Dept: OBSTETRICS AND GYNECOLOGY | Age: 53
End: 2023-11-14
Payer: COMMERCIAL

## 2023-11-14 ENCOUNTER — HOSPITAL ENCOUNTER (OUTPATIENT)
Facility: HOSPITAL | Age: 53
Discharge: HOME | End: 2023-11-14
Admitting: OBSTETRICS & GYNECOLOGY
Payer: COMMERCIAL

## 2023-11-14 VITALS
BODY MASS INDEX: 25.76 KG/M2 | WEIGHT: 140 LBS | DIASTOLIC BLOOD PRESSURE: 64 MMHG | HEIGHT: 62 IN | SYSTOLIC BLOOD PRESSURE: 118 MMHG

## 2023-11-14 DIAGNOSIS — R92.2 DENSE BREASTS: ICD-10-CM

## 2023-11-14 DIAGNOSIS — Z11.51 ENCOUNTER FOR SCREENING FOR HUMAN PAPILLOMAVIRUS (HPV): ICD-10-CM

## 2023-11-14 DIAGNOSIS — Z01.419 ENCOUNTER FOR GYNECOLOGICAL EXAMINATION: Primary | ICD-10-CM

## 2023-11-14 DIAGNOSIS — R92.30 DENSE BREASTS: ICD-10-CM

## 2023-11-14 DIAGNOSIS — Z12.31 ENCOUNTER FOR SCREENING MAMMOGRAM FOR MALIGNANT NEOPLASM OF BREAST: ICD-10-CM

## 2023-11-14 PROBLEM — Z00.00 ANNUAL PHYSICAL EXAM: Status: RESOLVED | Noted: 2023-03-20 | Resolved: 2023-11-14

## 2023-11-14 PROCEDURE — 77063 BREAST TOMOSYNTHESIS BI: CPT

## 2023-11-14 PROCEDURE — 77067 SCR MAMMO BI INCL CAD: CPT

## 2023-11-14 NOTE — PROGRESS NOTES
.Subjective     Chief Complaint   Patient presents with    Gynecologic Exam     Annual exam, Last Pap 10/31/2022 NEG, HPV NEG, Last Mammogram 10/31/2022, Pt has no complaints today, Doing well        History of Present Illness    Anna Jordan is a 53 y.o.  who presents for annual exam.  Pt denies vaginal bleeding or pelvic pain  She denies gyn issues.     She agrees with removal of her IUD today.    Obstetric History:  OB History          2    Para   2    Term   2            AB        Living   2         SAB        IAB        Ectopic        Molar        Multiple        Live Births   2               Menstrual History:     No LMP recorded (lmp unknown). Patient has had an implant.         Current contraception: post menopausal status, has mirena   History of abnormal Pap smear: yes - hx LEEP  Received Gardasil immunization: no  Perform regular self breast exam:  reg  Family history of uterine or ovarian cancer: pt reports she does not know  Family History of colon cancer: pt reports she does not know  Family history of breast cancer: yes - cousin -dx at 43 yo    Mammogram: scheduled today, pt missed imaging appt prior to annual. Unable to r/s today. Recommend she r/s asap  Colonoscopy: pt has scheduled in January  DEXA: not indicated.    Exercise: moderately active  Calcium/Vitamin D: adequate intake    The following portions of the patient's history were reviewed and updated as appropriate: allergies, current medications, past family history, past medical history, past social history, past surgical history, and problem list.    Review of Systems    Review of Systems   Constitutional: Positive for occ fatigue.   Respiratory: Negative for shortness of breath.    Gastrointestinal: Negative for abdominal pain.   Genitourinary: Negative for vag bleeding  Neurological: Negative for headaches.   Psychiatric/Behavioral: Negative for dysphoric mood.   Endocrine: negative for hot  "flashes        Objective   Physical Exam    /64   Ht 157.5 cm (62\")   Wt 63.5 kg (140 lb)   LMP  (LMP Unknown)   BMI 25.61 kg/m²   General:   Alert, in no distress   Heart: regular rate and rhythm   Lungs: clear to auscultation bilaterally   Breast: Inspection negative; no masses, retractions, nipple discharge or axillary adenopathy in either breast   Neck: Supple, no thyromegaly   Abdomen: Soft, no tenderness or guarding   Pelvis: External genitalia: normal general appearance  Urinary system: urethral meatus normal  Vaginal: normal mucosa without prolapse or lesions  Cervix: normal appearance and IUD string visualized  Adnexa: no masses or tenderness  Uterus: normal, nontender   Extremities: Normal without edema   Neurologic: Alert and oriented   Psychiatric: Normal affect, judgment and mood     Assessment & Plan   Diagnoses and all orders for this visit:    1. Encounter for gynecological examination (Primary)  -     IGP, Apt HPV,rfx 16 / 18,45    2. Encounter for screening for human papillomavirus (HPV)  -     IGP, Apt HPV,rfx 16 / 18,45    3. Encounter for screening mammogram for malignant neoplasm of breast  -     Mammo Screening Digital Tomosynthesis Bilateral With CAD; Future    4. Dense breasts  Comments:  echo kenney 13 %        All questions answered.  Breast self exam technique reviewed and patient encouraged to perform self-exam monthly.  Discussed healthy lifestyle modifications.  Recommended 30 minutes of aerobic exercise five times per week.  Discussed calcium/ Vit D needs to prevent osteoporosis.  Discussed HRT and pt prefers to avoid.   Advised pt to call if she does not receive results of pap and mammogram within 2 weeks of testing.      Procedures  IUD Removal Procedure Note    Type of IUD:  Mirena  Date of insertion:  unknown, around 2015 per records  Reason for removal:   menopause      Procedure Time Documentation  The risks of the procedure were reviewed with the patient including " bleeding, infection and unlikely damage to the uterus and the benefits of the procedure were explained to the patient and Verbal informed consent was obtained    Procedure Details  IUD strings visible:  yes  Local anesthesia:  None  Tenaculum used:  None  Removal:  IUD strings grasped and IUD removed intact with gentle traction.  The patient tolerated the procedure well.    All appropriate instructions regarding removal were reviewed.    Patient tolerated the procedure well without complications.          The patient was advised to call for any fever or for prolonged or severe pain or bleeding. Advised pt to call with recurrent vaginal bleeding after removal as menopause expected.     Nova Cowan MD  11/14/2023  14:45 EST

## 2024-01-17 NOTE — PRE-PROCEDURE INSTRUCTIONS
Arrival time of 0730.   Must have a  over the age of 18 for transportation home post- procedure.    Education provided on laxative administration; bowel prep to be taken in two doses. Reviewed clear liquid diet for day prior to procedure.     Instructed patient to not take any medication the morning of their procedure and to bring a list of medications to the hospital.     Instructed to take any nebulizer treatments prior to coming the morning of procedure.     Patient verbalized understanding of instructions.

## 2024-01-19 ENCOUNTER — TELEPHONE (OUTPATIENT)
Dept: GASTROENTEROLOGY | Facility: CLINIC | Age: 54
End: 2024-01-19
Payer: COMMERCIAL

## 2024-01-19 NOTE — TELEPHONE ENCOUNTER
Anna Haroman  1970    Patient requested to Reschedule their Colonoscopy. I have offered to reschedule this patient and patient has agreed. Patient has been rescheduled to 3/12/2024 with an estimated arrival time of 7:30 am.    Reason for cancelling/rescheduling: Work schedule    This procedure was ordered by BRUCE Clemons for an important reason. We want to inform you that there are risks associated with not proceeding with the procedure at this time such as a delay in diagnosis, risk of incurable disease, or cancer.    Patient plans to call us back to reschedule: No    Updated clearance needed?: N/A    If yes, clearance request has been submitted to: N/A    Is the patient currently on any injectable medications for weight loss or diabetes? N/A    If yes, are they aware that they will need to discontinue this 7 days prior to their procedure? N/a    Patient verbalized understanding for all of the above information.

## 2024-03-04 NOTE — PRE-PROCEDURE INSTRUCTIONS
"PAT call attempted.  No answer.  Detailed message with date and arrival time of 0730 given.  Come to entrance \"C\"; must have adult  for transportation home; may have two visitors; however, children under 12 must remain in waiting area; instructed on diet/clear liquids/NPO/bowel prep, if needed; may take normal meds two hours prior to arrival time except for blood thinners, antidiabetics, diuretics, and weight loss meds.  Instructed to return call to confirm receipt of instructions and for any questions.  "

## 2024-03-11 ENCOUNTER — ANESTHESIA EVENT (OUTPATIENT)
Dept: GASTROENTEROLOGY | Facility: HOSPITAL | Age: 54
End: 2024-03-11
Payer: COMMERCIAL

## 2024-03-11 NOTE — ANESTHESIA PREPROCEDURE EVALUATION
Anesthesia Evaluation     NPO Solid Status: > 8 hours  NPO Liquid Status: > 2 hours           Airway   Mallampati: II  TM distance: >3 FB  Neck ROM: full  No difficulty expected  Dental - normal exam     Pulmonary - normal exam   Cardiovascular - normal exam    (+) valvular problems/murmurs MVP      Neuro/Psych  GI/Hepatic/Renal/Endo      Musculoskeletal     Abdominal  - normal exam   Substance History      OB/GYN          Other        ROS/Med Hx Other: ECHO 6/30/23:   ·  Left ventricular systolic function is normal. Calculated left ventricular EF = 62.5%  ·  Left ventricular diastolic function was normal.  ·  Estimated right ventricular systolic pressure from tricuspid regurgitation is normal (<35 mmHg).  ·  No mitral valve prolapse is noted                         Anesthesia Plan    ASA 2     general   total IV anesthesia  (Total IV Anesthesia    Patient understands anesthesia not responsible for dental damage.  )  intravenous induction     Anesthetic plan, risks, benefits, and alternatives have been provided, discussed and informed consent has been obtained with: patient.    Plan discussed with CRNA.        CODE STATUS:

## 2024-03-12 ENCOUNTER — HOSPITAL ENCOUNTER (OUTPATIENT)
Facility: HOSPITAL | Age: 54
Setting detail: HOSPITAL OUTPATIENT SURGERY
Discharge: HOME | End: 2024-03-12
Attending: INTERNAL MEDICINE | Admitting: INTERNAL MEDICINE
Payer: COMMERCIAL

## 2024-03-12 ENCOUNTER — TELEPHONE (OUTPATIENT)
Dept: GASTROENTEROLOGY | Facility: CLINIC | Age: 54
End: 2024-03-12
Payer: COMMERCIAL

## 2024-03-12 ENCOUNTER — ANESTHESIA (OUTPATIENT)
Dept: GASTROENTEROLOGY | Facility: HOSPITAL | Age: 54
End: 2024-03-12
Payer: COMMERCIAL

## 2024-03-12 VITALS
DIASTOLIC BLOOD PRESSURE: 76 MMHG | RESPIRATION RATE: 11 BRPM | OXYGEN SATURATION: 100 % | HEART RATE: 69 BPM | BODY MASS INDEX: 25.96 KG/M2 | TEMPERATURE: 97 F | HEIGHT: 62 IN | WEIGHT: 141.09 LBS | SYSTOLIC BLOOD PRESSURE: 109 MMHG

## 2024-03-12 LAB — B-HCG UR QL: NEGATIVE

## 2024-03-12 PROCEDURE — 25810000003 LACTATED RINGERS PER 1000 ML

## 2024-03-12 PROCEDURE — 81025 URINE PREGNANCY TEST: CPT | Performed by: INTERNAL MEDICINE

## 2024-03-12 PROCEDURE — 25010000002 PROPOFOL 10 MG/ML EMULSION

## 2024-03-12 RX ORDER — LIDOCAINE HYDROCHLORIDE 20 MG/ML
INJECTION, SOLUTION EPIDURAL; INFILTRATION; INTRACAUDAL; PERINEURAL AS NEEDED
Status: DISCONTINUED | OUTPATIENT
Start: 2024-03-12 | End: 2024-03-12 | Stop reason: SURG

## 2024-03-12 RX ORDER — PROPOFOL 10 MG/ML
VIAL (ML) INTRAVENOUS AS NEEDED
Status: DISCONTINUED | OUTPATIENT
Start: 2024-03-12 | End: 2024-03-12 | Stop reason: SURG

## 2024-03-12 RX ORDER — SODIUM CHLORIDE, SODIUM LACTATE, POTASSIUM CHLORIDE, CALCIUM CHLORIDE 600; 310; 30; 20 MG/100ML; MG/100ML; MG/100ML; MG/100ML
30 INJECTION, SOLUTION INTRAVENOUS CONTINUOUS
Status: DISCONTINUED | OUTPATIENT
Start: 2024-03-12 | End: 2024-03-12 | Stop reason: HOSPADM

## 2024-03-12 RX ORDER — ERGOCALCIFEROL 1.25 MG/1
50000 CAPSULE ORAL WEEKLY
COMMUNITY

## 2024-03-12 RX ADMIN — SODIUM CHLORIDE, POTASSIUM CHLORIDE, SODIUM LACTATE AND CALCIUM CHLORIDE 30 ML/HR: 600; 310; 30; 20 INJECTION, SOLUTION INTRAVENOUS at 08:16

## 2024-03-12 RX ADMIN — PROPOFOL 80 MG: 10 INJECTION, EMULSION INTRAVENOUS at 09:22

## 2024-03-12 RX ADMIN — PROPOFOL 175 MCG/KG/MIN: 10 INJECTION, EMULSION INTRAVENOUS at 09:23

## 2024-03-12 RX ADMIN — LIDOCAINE HYDROCHLORIDE 70 MG: 20 INJECTION, SOLUTION EPIDURAL; INFILTRATION; INTRACAUDAL; PERINEURAL at 09:22

## 2024-03-12 RX ADMIN — SODIUM CHLORIDE, POTASSIUM CHLORIDE, SODIUM LACTATE AND CALCIUM CHLORIDE: 600; 310; 30; 20 INJECTION, SOLUTION INTRAVENOUS at 09:19

## 2024-03-12 NOTE — ANESTHESIA POSTPROCEDURE EVALUATION
Patient: Anna Jordan    Procedure Summary       Date: 03/12/24 Room / Location: [facility] ENDOSCOPY 2 / [facility] ENDOSCOPY    Anesthesia Start: 0919 Anesthesia Stop: 0947    Procedure: COLONOSCOPY Diagnosis:       Encounter for screening for malignant neoplasm of colon      Colon cancer screening      (Encounter for screening for malignant neoplasm of colon [Z12.11])      (Colon cancer screening [Z12.11])    Surgeons: Elpidio Dixon MD Provider: Nadir Alvarez CRNA    Anesthesia Type: general ASA Status: 2            Anesthesia Type: general    Vitals  Vitals Value Taken Time   /76 03/12/24 1001   Temp 36.1 °C (97 °F) 03/12/24 1001   Pulse 67 03/12/24 1003   Resp 11 03/12/24 1001   SpO2 100 % 03/12/24 1003   Vitals shown include unfiled device data.        Post Anesthesia Care and Evaluation    Post-procedure mental status: acceptable.  Pain management: satisfactory to patient    Airway patency: patent  Anesthetic complications: No anesthetic complications    Cardiovascular status: acceptable  Respiratory status: acceptable    Comments: Per chart review

## 2024-03-12 NOTE — H&P
"ScreeningPre Procedure History & Physical    Chief Complaint:   Screening     Subjective     HPI:   Screening     Past Medical History:   Past Medical History:   Diagnosis Date    Allergic 1997    Iodine    Mitral valve prolapse        Past Surgical History:  Past Surgical History:   Procedure Laterality Date    BREAST BIOPSY      CERVICAL BIOPSY  W/ LOOP ELECTRODE EXCISION         Family History:  Family History   Problem Relation Age of Onset    Leukemia Father     No Known Problems Mother     No Known Problems Brother     No Known Problems Sister     No Known Problems Son     No Known Problems Daughter     Colon cancer Paternal Grandmother     No Known Problems Maternal Grandmother     No Known Problems Maternal Aunt     No Known Problems Paternal Aunt     Breast cancer Maternal Cousin     BRCA 1/2 Neg Hx     Endometrial cancer Neg Hx     Ovarian cancer Neg Hx     Uterine cancer Neg Hx        Social History:   reports that she has never smoked. She has never used smokeless tobacco. She reports that she does not drink alcohol and does not use drugs.    Medications:   Medications Prior to Admission   Medication Sig Dispense Refill Last Dose    vitamin D (ERGOCALCIFEROL) 1.25 MG (51217 UT) capsule capsule Take 1 capsule by mouth 1 (One) Time Per Week.   Past Month    multivitamin with minerals tablet tablet Take 1 tablet by mouth Daily. (Patient not taking: Reported on 11/14/2023)   More than a month       Allergies:  Iodine        Objective     Blood pressure 119/70, pulse 76, temperature 98.1 °F (36.7 °C), temperature source Temporal, resp. rate 14, height 157.5 cm (62\"), weight 64 kg (141 lb 1.5 oz), SpO2 99%, not currently breastfeeding.    Physical Exam   Constitutional: Pt is oriented to person, place, and time and well-developed, well-nourished, and in no distress.   Mouth/Throat: Oropharynx is clear and moist.   Neck: Normal range of motion.   Cardiovascular: Normal rate, regular rhythm and normal heart " sounds.    Pulmonary/Chest: Effort normal and breath sounds normal.   Abdominal: Soft. Nontender  Skin: Skin is warm and dry.   Psychiatric: Mood, memory, affect and judgment normal.     Assessment & Plan     Diagnosis:  Screening colonoscopy       Anticipated Surgical Procedure:  colonoscopy    The risks, benefits, and alternatives of this procedure have been discussed with the patient or the responsible party- the patient understands and agrees to proceed.

## 2024-03-14 ENCOUNTER — TELEPHONE (OUTPATIENT)
Dept: OBSTETRICS AND GYNECOLOGY | Age: 54
End: 2024-03-14
Payer: COMMERCIAL

## 2024-03-14 NOTE — TELEPHONE ENCOUNTER
Please call pt and advise bleeding may be postmenopausal bleeding which can be due to abnormalities. Recommend u/s and embx to evaluate further. Please schedule pt for appt with me.

## 2024-03-14 NOTE — TELEPHONE ENCOUNTER
Pt states you told her she was in Menapause last year and she just had a period, wanting to know why?    Pt aware you are out today

## 2024-03-15 NOTE — TELEPHONE ENCOUNTER
Called pt as she requested and discussed her symptoms. She reports she had a normal menses recently after IUD removed in November. She had an elevated FSH in 2022. Discussed possibility of some residual ovarian function in perimenopause but also noted that endometrial pathology is possible including polyp or other pathology. Recommended u/s and embx to evaluate further. Pt declines further evaluation at this time.

## (undated) DEVICE — Device: Brand: DEFENDO AIR/WATER/SUCTION AND BIOPSY VALVE

## (undated) DEVICE — LINER SURG CANSTR SXN S/RIGD 1500CC

## (undated) DEVICE — SOL IRRG H2O PL/BG 1000ML STRL

## (undated) DEVICE — CONN JET HYDRA H20 AUXILIARY DISP

## (undated) DEVICE — Device

## (undated) DEVICE — SOLIDIFIER LIQLOC PLS 1500CC BT